# Patient Record
Sex: MALE | Race: WHITE | NOT HISPANIC OR LATINO | Employment: FULL TIME | URBAN - METROPOLITAN AREA
[De-identification: names, ages, dates, MRNs, and addresses within clinical notes are randomized per-mention and may not be internally consistent; named-entity substitution may affect disease eponyms.]

---

## 2018-06-05 ENCOUNTER — CLINICAL SUPPORT (OUTPATIENT)
Dept: URGENT CARE | Facility: CLINIC | Age: 48
End: 2018-06-05

## 2018-06-05 DIAGNOSIS — Z00.00 PHYSICAL EXAM: Primary | ICD-10-CM

## 2018-06-05 PROCEDURE — 80305 DRUG TEST PRSMV DIR OPT OBS: CPT | Mod: S$GLB,,, | Performed by: PREVENTIVE MEDICINE

## 2018-06-05 PROCEDURE — 99499 UNLISTED E&M SERVICE: CPT | Mod: S$GLB,,, | Performed by: PREVENTIVE MEDICINE

## 2020-03-24 ENCOUNTER — NURSE TRIAGE (OUTPATIENT)
Dept: ADMINISTRATIVE | Facility: CLINIC | Age: 50
End: 2020-03-24

## 2020-03-24 NOTE — TELEPHONE ENCOUNTER
48 yo male calls with intermittent cough and fever. Was treated for influenza in December and has ongoing issues with GERD (appt with gastro on 3/27). Cough has been constant for last few months, productive of yellow sputum. Currently afebrile, but says it has been recurring sporadically since his evaluation in December. Describes intermittent dyspnea, but is alleviated by rest. Describes fatigue. No known COVID exposures except for girlfriend who was in contact with confirmed positive case. No recent travel history. Seen recently by PCP and treated for URI, requesting testing for COVID. Referred to [facility], gave coupon code.     Reason for Disposition   [1] Fever (or feeling feverish) OR cough occurs AND [2] travel from or living in high risk area (identified by CDC) AND [3] within last 14 days    Additional Information   Negative: Severe difficulty breathing (e.g., struggling for each breath, speak in single words, bluish lips)   Negative: Sounds like a life-threatening emergency to the triager   Negative: [1] Difficulty breathing (shortness of breath) occurs AND [2] onset > 14 days after COVID-19 EXPOSURE (Close Contact)   Negative: [1] Dry cough occurs AND [2] onset > 14 days after COVID-19 EXPOSURE   Negative: [1] Wet cough (i.e., white-yellow, yellow, green, or torsten colored sputum) AND [2] onset > 14 days after COVID-19 EXPOSURE   Negative: [1] Common cold symptoms AND [2] onset > 14 days after COVID-19 EXPOSURE   Negative: [1] Difficulty breathing occurs AND [2] within 14 days of COVID-19 EXPOSURE (Close Contact)   Negative: Patient sounds very sick or weak to the triager    Protocols used: CORONAVIRUS (COVID-19) EXPOSURE-A-

## 2024-04-17 ENCOUNTER — OFFICE VISIT (OUTPATIENT)
Dept: URGENT CARE | Facility: CLINIC | Age: 54
End: 2024-04-17
Payer: OTHER MISCELLANEOUS

## 2024-04-17 VITALS
SYSTOLIC BLOOD PRESSURE: 138 MMHG | HEART RATE: 90 BPM | RESPIRATION RATE: 18 BRPM | BODY MASS INDEX: 23.49 KG/M2 | HEIGHT: 68 IN | DIASTOLIC BLOOD PRESSURE: 83 MMHG | OXYGEN SATURATION: 97 % | WEIGHT: 155 LBS | TEMPERATURE: 99 F

## 2024-04-17 DIAGNOSIS — F43.0 STRESS REACTION: ICD-10-CM

## 2024-04-17 DIAGNOSIS — F17.200 NEEDS SMOKING CESSATION EDUCATION: ICD-10-CM

## 2024-04-17 DIAGNOSIS — Z02.6 ENCOUNTER RELATED TO WORKER'S COMPENSATION CLAIM: Primary | ICD-10-CM

## 2024-04-17 LAB
CTP QC/QA: YES
POC 10 PANEL DRUG SCREEN: ABNORMAL

## 2024-04-17 PROCEDURE — 80305 DRUG TEST PRSMV DIR OPT OBS: CPT | Mod: S$GLB,,,

## 2024-04-17 PROCEDURE — 99203 OFFICE O/P NEW LOW 30 MIN: CPT | Mod: S$GLB,,,
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
## 2024-05-01 ENCOUNTER — OFFICE VISIT (OUTPATIENT)
Dept: URGENT CARE | Facility: CLINIC | Age: 54
End: 2024-05-01
Payer: OTHER MISCELLANEOUS

## 2024-05-01 VITALS
HEIGHT: 68 IN | HEART RATE: 88 BPM | OXYGEN SATURATION: 97 % | DIASTOLIC BLOOD PRESSURE: 77 MMHG | BODY MASS INDEX: 23.49 KG/M2 | WEIGHT: 155 LBS | SYSTOLIC BLOOD PRESSURE: 121 MMHG | RESPIRATION RATE: 16 BRPM

## 2024-05-01 DIAGNOSIS — Z02.6 ENCOUNTER RELATED TO WORKER'S COMPENSATION CLAIM: ICD-10-CM

## 2024-05-01 DIAGNOSIS — F43.0 STRESS REACTION: Primary | ICD-10-CM

## 2024-05-01 PROCEDURE — 99213 OFFICE O/P EST LOW 20 MIN: CPT | Mod: S$GLB,,,

## 2024-05-01 NOTE — LETTER
[facility]  [address] [address]  Phone: [phone]  Fax: [phone]  [facility]: [id number]    Pt Name: Ebenezer Galicia  Injury Date: 03/04/2024   Employee ID: [id number] Date of First Treatment: 05/01/2024   Company: [employer]      Appointment Time: 03:00 PM Arrived: 2:49 PM    Provider: Anthony Jefferson,  Time Out:3:46 PM      Office Treatment:   1. Stress reaction    2. Encounter related to worker's compensation claim               Restrictions: Regular Duty     Return Appointment: 5/14/2024 at 3:00 PM MAY

## 2024-05-01 NOTE — PROGRESS NOTES
Subjective:      Patient ID: Ebenezer Galicia is a 53 y.o. male.    Chief Complaint: Stress    See MA note.  He continues to experience intermittent symptoms that are unchanged since his last evaluation.  He has been able to perform his job duties without any difficulty but occasionally loses focus due to anxiousness from the event that occurred early in March.  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  More recently he noticed difficulty engaging with some of his own friends during a lunch get together.  He only stayed for lunch for short period time before returning home.  States he felt the knee to avoid social engagement.  Denies any thoughts of hurting himself or others.  During last visit I placed an referral for counselor engagement which is pending approval at this time.    Patient's place of employment - [employer]  Patient's job title - maintenance tech  Date of Injury - 03/04/2024  Body part injured - mental stress  Current work status per last visit - Regular Duty  Improved, same, or worse - same  Pain Scale right now (1-10) -  0/10      Constitution: Negative.   HENT: Negative.     Neck: neck negative.   Cardiovascular: Negative.    Eyes: Negative.    Respiratory: Negative.     Gastrointestinal: Negative.    Musculoskeletal:  Positive for arthritis.   Skin: Negative.    Neurological: Negative.    Psychiatric/Behavioral:  Positive for agitation, nervous/anxious and sleep disturbance. Negative for confusion, hallucinations, homicidal ideas, suicidal ideas, self-injury and substance abuse. The patient is nervous/anxious.      Objective:     Physical Exam  Vitals and nursing note reviewed.   Constitutional:       General: He is not in acute distress.     Appearance: Normal appearance. He is normal weight. He is not ill-appearing or toxic-appearing.   HENT:      Head: Normocephalic.      Right  Ear: External ear normal.      Left Ear: External ear normal.      Nose: Nose normal.   Eyes:      Conjunctiva/sclera: Conjunctivae normal.   Abdominal:      General: Abdomen is flat.   Musculoskeletal:         General: Normal range of motion.      Cervical back: Normal range of motion.   Skin:     General: Skin is warm.      Capillary Refill: Capillary refill takes less than 2 seconds.   Neurological:      General: No focal deficit present.      Mental Status: He is alert.   Psychiatric:         Mood and Affect: Mood normal.         Behavior: Behavior normal.         Thought Content: Thought content normal.         Judgment: Judgment normal.        Assessment:      1. Stress reaction    2. Encounter related to worker's compensation claim      Plan:   I recommended that Jose obtain a journal to document thoughts and feelings throughout his day as a means of helping him better understand some triggers.  This information can also be used by the counselor to help with any sessions they may have.  He did inquire whether medications might be a possibility given his recent episode of difficulty with social engagement.  We did discuss at length any prefers to continue with the possibility counseling before taking any medication.  Should his symptoms worsen then we may consider medication intervention.  He did have a copy of the worker's comp insurance claim number which should help expedite the approval of the order to the counselor.  Maintain regular work duty status and we will reassess in approximately 2 weeks    I spent a total of 25 minutes on the day of the visit.This includes face to face time and non-face to face time preparing to see the patient (eg, review of tests), obtaining and/or reviewing separately obtained history, documenting clinical information in the electronic or other health record, independently interpreting results and communicating results to the patient/family/caregiver, or care  coordinator.             Restrictions: Regular Duty  Follow up in about 13 days (around 5/14/2024).

## 2024-05-14 ENCOUNTER — OFFICE VISIT (OUTPATIENT)
Dept: URGENT CARE | Facility: CLINIC | Age: 54
End: 2024-05-14
Payer: OTHER MISCELLANEOUS

## 2024-05-14 VITALS
BODY MASS INDEX: 23.49 KG/M2 | WEIGHT: 155 LBS | DIASTOLIC BLOOD PRESSURE: 84 MMHG | OXYGEN SATURATION: 99 % | SYSTOLIC BLOOD PRESSURE: 125 MMHG | RESPIRATION RATE: 14 BRPM | HEART RATE: 77 BPM | HEIGHT: 68 IN

## 2024-05-14 DIAGNOSIS — F43.0 STRESS REACTION: Primary | ICD-10-CM

## 2024-05-14 DIAGNOSIS — Z02.6 ENCOUNTER RELATED TO WORKER'S COMPENSATION CLAIM: ICD-10-CM

## 2024-05-14 PROCEDURE — 99213 OFFICE O/P EST LOW 20 MIN: CPT | Mod: S$GLB,,,

## 2024-05-14 NOTE — PROGRESS NOTES
Subjective:      Patient ID: Ebenezer Galicia is a 53 y.o. male.    Chief Complaint: Stress    See MA note.  He continues to experience intermittent symptoms that are unchanged since his last evaluation.  He has been able to perform his job duties without any difficulty but occasionally loses focus due to anxiousness from the event that occurred early in March. Symptoms and acute episodes are unchanged since last evaluation. Denies any thoughts of hurting himself or others.  During last visit I placed an referral for counselor engagement which is pending approval at this time     Patient's place of employment - [employer]  Patient's job title - maintenance tech  Date of Injury - 03/04/2024  Body part injured - mental stress  Current work status per last visit - Regular Duty  Improved, same, or worse - same  Pain Scale right now (1-10) -  0/10      Musculoskeletal:  Negative for pain.   Neurological:  Negative for disorientation.   Psychiatric/Behavioral:  Positive for nervous/anxious and depression. Negative for disorientation, confusion, agitation, sleep disturbance, hallucinations, homicidal ideas, suicidal ideas and self-injury. The patient is nervous/anxious.      Objective:     Physical Exam  Vitals and nursing note reviewed.   Constitutional:       General: He is not in acute distress.     Appearance: Normal appearance. He is normal weight. He is not ill-appearing or toxic-appearing.   HENT:      Head: Normocephalic.      Right Ear: External ear normal.      Left Ear: External ear normal.      Nose: Nose normal.   Eyes:      Conjunctiva/sclera: Conjunctivae normal.   Abdominal:      General: Abdomen is flat.   Musculoskeletal:         General: Normal range of motion.      Cervical back: Normal range of motion.   Skin:     General: Skin is warm.      Capillary Refill: Capillary refill takes less than 2 seconds.   Neurological:      General: No focal deficit present.      Mental Status: He is alert.    Psychiatric:         Mood and Affect: Mood normal.         Behavior: Behavior normal.         Thought Content: Thought content normal.         Judgment: Judgment normal.     Assessment:      1. Stress reaction    2. Encounter related to worker's compensation claim      Plan:   Per my understanding the claim has been now been filed and the referral to see psychologist/counselor has been approved.  Our coordination team is working on getting the appointment set up.  Hopefully an appointment will be set up in the near future.  He still prefers the route of counseling over use of medication which I agree with at this time.  It is possible in the future we may consider medication should he still have difficulties after counseling.  Follow up on a p.r.n. basis but I will keep in contact with her coordination team to ensure that he gets his appointment set up.    I spent a total of 25 minutes on the day of the visit.This includes face to face time and non-face to face time preparing to see the patient (eg, review of tests), obtaining and/or reviewing separately obtained history, documenting clinical information in the electronic or other health record, independently interpreting results and communicating results to the patient/family/caregiver, or care coordinator.       Patient Instructions: Referral to specialist to be scheduled, once authorized   Restrictions: Regular Duty  Follow up if symptoms worsen or fail to improve.

## 2024-05-14 NOTE — LETTER
[facility]  [address] [address]  Phone: [phone]  Fax: [phone]  [facility]: [id number]    Pt Name: Ebenezer Galicia  Injury Date: 03/04/2024   Employee ID: [id number] Date of Treatment: 05/14/2024   Company: [employer]      Appointment Time: 03:00 PM Arrived: 2:50 PM    Provider: Anthony Jefferson,  Time Out:3:43 PM      Office Treatment:   1. Stress reaction    2. Encounter related to worker's compensation claim          Patient Instructions: Referral to specialist to be scheduled, once authorized      Restrictions: Regular Duty     Return As needed. MAY

## 2024-05-28 ENCOUNTER — TELEPHONE (OUTPATIENT)
Dept: URGENT CARE | Facility: CLINIC | Age: 54
End: 2024-05-28
Payer: OTHER MISCELLANEOUS

## 2024-06-05 ENCOUNTER — TELEPHONE (OUTPATIENT)
Dept: PSYCHIATRY | Facility: CLINIC | Age: 54
End: 2024-06-05
Payer: OTHER MISCELLANEOUS

## 2024-06-05 NOTE — TELEPHONE ENCOUNTER
Called pt to discuss STeP program. Pt expressed interest and stated times and days of the week that work better. Will follow up with department to schedule.

## 2024-06-14 ENCOUNTER — CLINICAL SUPPORT (OUTPATIENT)
Dept: PSYCHIATRY | Facility: CLINIC | Age: 54
End: 2024-06-14
Payer: COMMERCIAL

## 2024-06-14 DIAGNOSIS — F43.0 STRESS REACTION: Primary | ICD-10-CM

## 2024-06-14 PROCEDURE — 90791 PSYCH DIAGNOSTIC EVALUATION: CPT | Mod: S$GLB,,, | Performed by: CASE MANAGER/CARE COORDINATOR

## 2024-06-21 ENCOUNTER — TELEPHONE (OUTPATIENT)
Dept: PSYCHIATRY | Facility: CLINIC | Age: 54
End: 2024-06-21
Payer: COMMERCIAL

## 2024-06-25 ENCOUNTER — TELEPHONE (OUTPATIENT)
Dept: PSYCHIATRY | Facility: CLINIC | Age: 54
End: 2024-06-25
Payer: COMMERCIAL

## 2024-07-25 ENCOUNTER — TELEPHONE (OUTPATIENT)
Dept: PSYCHIATRY | Facility: CLINIC | Age: 54
End: 2024-07-25
Payer: COMMERCIAL

## 2024-08-14 ENCOUNTER — TELEPHONE (OUTPATIENT)
Dept: PSYCHIATRY | Facility: CLINIC | Age: 54
End: 2024-08-14
Payer: COMMERCIAL
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
## 2024-08-28 ENCOUNTER — CLINICAL SUPPORT (OUTPATIENT)
Dept: PSYCHIATRY | Facility: CLINIC | Age: 54
End: 2024-08-28
Payer: COMMERCIAL

## 2024-08-28 DIAGNOSIS — F22 PARANOIA: ICD-10-CM

## 2024-08-28 DIAGNOSIS — F41.1 GAD (GENERALIZED ANXIETY DISORDER): ICD-10-CM

## 2024-08-28 PROCEDURE — 90837 PSYTX W PT 60 MINUTES: CPT | Mod: S$GLB,,, | Performed by: CASE MANAGER/CARE COORDINATOR

## 2024-09-27 ENCOUNTER — CLINICAL SUPPORT (OUTPATIENT)
Dept: PSYCHIATRY | Facility: CLINIC | Age: 54
End: 2024-09-27
Payer: COMMERCIAL

## 2024-09-27 DIAGNOSIS — F41.1 GAD (GENERALIZED ANXIETY DISORDER): Primary | ICD-10-CM

## 2024-09-27 DIAGNOSIS — F22 PARANOIA: ICD-10-CM

## 2024-09-27 PROCEDURE — 90837 PSYTX W PT 60 MINUTES: CPT | Mod: S$GLB,,, | Performed by: CASE MANAGER/CARE COORDINATOR

## 2024-10-10 ENCOUNTER — CLINICAL SUPPORT (OUTPATIENT)
Dept: PSYCHIATRY | Facility: CLINIC | Age: 54
End: 2024-10-10
Payer: COMMERCIAL

## 2024-10-10 DIAGNOSIS — F22 PARANOIA: ICD-10-CM

## 2024-10-10 DIAGNOSIS — F41.1 GAD (GENERALIZED ANXIETY DISORDER): Primary | ICD-10-CM

## 2024-10-10 PROCEDURE — 90837 PSYTX W PT 60 MINUTES: CPT | Mod: S$GLB,,, | Performed by: CASE MANAGER/CARE COORDINATOR
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
## 2024-10-24 ENCOUNTER — CLINICAL SUPPORT (OUTPATIENT)
Dept: PSYCHIATRY | Facility: CLINIC | Age: 54
End: 2024-10-24
Payer: COMMERCIAL

## 2024-10-24 DIAGNOSIS — F22 PARANOIA: ICD-10-CM

## 2024-10-24 DIAGNOSIS — F41.1 GAD (GENERALIZED ANXIETY DISORDER): Primary | ICD-10-CM

## 2024-10-24 PROCEDURE — 90837 PSYTX W PT 60 MINUTES: CPT | Mod: S$GLB,,, | Performed by: CASE MANAGER/CARE COORDINATOR

## 2024-11-07 ENCOUNTER — CLINICAL SUPPORT (OUTPATIENT)
Dept: PSYCHIATRY | Facility: CLINIC | Age: 54
End: 2024-11-07
Payer: COMMERCIAL

## 2024-11-07 DIAGNOSIS — F43.10 PTSD (POST-TRAUMATIC STRESS DISORDER): Primary | ICD-10-CM

## 2024-11-07 DIAGNOSIS — F22 PARANOIA: ICD-10-CM

## 2024-11-07 PROCEDURE — 90837 PSYTX W PT 60 MINUTES: CPT | Mod: S$GLB,,, | Performed by: CASE MANAGER/CARE COORDINATOR
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
## 2024-11-21 ENCOUNTER — CLINICAL SUPPORT (OUTPATIENT)
Dept: PSYCHIATRY | Facility: CLINIC | Age: 54
End: 2024-11-21
Payer: COMMERCIAL

## 2024-11-21 DIAGNOSIS — F22 PARANOIA: ICD-10-CM

## 2024-11-21 DIAGNOSIS — F43.10 PTSD (POST-TRAUMATIC STRESS DISORDER): Primary | ICD-10-CM

## 2024-11-21 PROCEDURE — 90837 PSYTX W PT 60 MINUTES: CPT | Mod: 95,,, | Performed by: CASE MANAGER/CARE COORDINATOR
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
## 2025-03-10 ENCOUNTER — OFFICE VISIT (OUTPATIENT)
Dept: URGENT CARE | Facility: CLINIC | Age: 55
End: 2025-03-10
Payer: OTHER MISCELLANEOUS

## 2025-03-10 VITALS
HEART RATE: 70 BPM | TEMPERATURE: 99 F | WEIGHT: 158 LBS | OXYGEN SATURATION: 97 % | BODY MASS INDEX: 23.4 KG/M2 | DIASTOLIC BLOOD PRESSURE: 75 MMHG | RESPIRATION RATE: 18 BRPM | SYSTOLIC BLOOD PRESSURE: 119 MMHG | HEIGHT: 69 IN

## 2025-03-10 DIAGNOSIS — S39.012A ACUTE MYOFASCIAL STRAIN OF LUMBAR REGION, INITIAL ENCOUNTER: Primary | ICD-10-CM

## 2025-03-10 DIAGNOSIS — Z02.6 ENCOUNTER RELATED TO WORKER'S COMPENSATION CLAIM: ICD-10-CM

## 2025-03-10 LAB
BREATH ALCOHOL: 0
CTP QC/QA: YES
POC 10 PANEL DRUG SCREEN: ABNORMAL

## 2025-03-10 PROCEDURE — 72100 X-RAY EXAM L-S SPINE 2/3 VWS: CPT | Mod: S$GLB,,, | Performed by: RADIOLOGY

## 2025-03-10 RX ORDER — BUPROPION HYDROCHLORIDE 150 MG/1
150 TABLET ORAL
COMMUNITY
Start: 2025-02-07

## 2025-03-10 RX ORDER — CYCLOBENZAPRINE HCL 10 MG
5 TABLET ORAL 3 TIMES DAILY PRN
COMMUNITY
Start: 2025-03-08 | End: 2025-03-14 | Stop reason: ALTCHOICE

## 2025-03-10 NOTE — LETTER
[facility]  [address] [address]  Phone: [phone]  Fax: [phone]  [facility]: [id number]    Pt Name: Ebenezer Galicia  Injury Date: 03/07/2025   Employee ID: [id number] Date of First Treatment: 03/10/2025   Company: [employer]      Appointment Time:  Arrived: 10:25 AM    Provider: Masha Dominguez MD Time Out:12:56 PM      Office Treatment:   1. Acute myofascial strain of lumbar region, initial encounter    2. Encounter related to worker's compensation claim          Patient Instructions: Daily home exercises/warm soaks, Apply ice 24-48 hours then apply heat/warm soaks (Use NSAID consistently for 3-4 days then as needed for pain, supplement with regular strength tylenol at the same time. Heating pads okay.)      Restrictions: Avoid frequent bending/lifting/twisting, Avoid climbing/kneeling/squatting, No lifting/pushing/pulling more than 25 lbs     Return Appointment: 3/14/2025 at 3:15 PM MAY

## 2025-03-10 NOTE — PROGRESS NOTES
Subjective:      Patient ID: Ebenezer Galicia is a 54 y.o. male.    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 Negative for trauma and joint swelling.   Skin:  Negative for erythema and bruising.       See MA note above. Begin MD note:    Ebenezer Galicia is a 54 y.o.  presenting for evaluation of low back pain.   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 He had to run a  machine in the wall 3-4 times. He was sitting in front of the sink on the floor using his left hand to pull and push the cable into the drains. He has done this work before, depending on the sink you have to get on knees or sit in an awkward position to get the equipment into place to clear the clog. He felt a little stiff when he was leaving work that day, by the time he finished his 10-15 minute commute home he could barely walk. He did Epsom salt bath on Friday, but was unable to sleep Friday night on his firm mattress. Saturday morning he went to the ED. The doctor diagnosed him with a lumbar strain. He was discharged with Lodine and flexeril. They also gave him toradol injection. He was able to sleep for a few hours after.   Pain does not radiate. No radicular symptoms. No issues with walking.   He denies any prior hip or back issues aside from normal aches and pains. He is working  on quitting smoking.     Objective:     Physical Exam  Vitals and nursing note reviewed.   Constitutional:       General: He is not in acute distress.     Appearance: He is not ill-appearing.   HENT:      Head: Normocephalic.   Eyes:      Conjunctiva/sclera: Conjunctivae normal.   Pulmonary:      Effort: Pulmonary effort is normal. No respiratory distress.   Musculoskeletal:         General: Normal range of motion.      Cervical back: No tenderness.      Thoracic back: No tenderness.      Lumbar back: Tenderness present. No bony tenderness. Normal range of motion. Negative right straight leg raise test and negative left straight leg raise test.        Back:       Comments: FAROM of the lumbar spine with pain noted with lateral bending. Negative SLR bilaterally, reports pain to the left low back when returning leg back to neutral position. Groin and lumbar pain with left JOANN. Left-side resisted hip abduction slightly weaker than right otherwise 5/5 strength. No midline tenderness. TTP of the left lumbar region. Normal gait.    Skin:     General: Skin is warm.      Coloration: Skin is not pale.      Findings: No erythema.   Neurological:      General: No focal deficit present.      Mental Status: He is alert and oriented to person, place, and time.      GCS: GCS eye subscore is 4. GCS verbal subscore is 5. GCS motor subscore is 6.      Motor: Motor function is intact.      Coordination: Coordination is intact.   Psychiatric:         Attention and Perception: Attention normal.         Mood and Affect: Mood normal.         Speech: Speech normal.         Behavior: Behavior normal. Behavior is cooperative.         Thought Content: Thought content normal.        Imaging  XR LUMBAR SPINE 2 OR 3 VIEWS  Result Date: 3/10/2025  EXAMINATION: XR LUMBAR SPINE 2 OR 3 VIEWS CLINICAL HISTORY: Encounter for examination for insurance purposes TECHNIQUE: Lumbar spine 2 or three views COMPARISON: None FINDINGS: Vertebral bodies are  intact. Disc spaces are maintained no collapse or bony destruction is noted.     See above Electronically signed by: Gordon Rm MD Date:    03/10/2025 Time:    12:12      Assessment:      1. Acute myofascial strain of lumbar region, initial encounter    2. Encounter related to worker's compensation claim      Plan:     Radiograph performed to evaluate bony structures, no acute injury noted on independent review with patient. History and exam is consistent with lumbar strain, given the accompanying hip pain I am concerned for deep muscular etiology, psoas versus quadratus lumborum. Advised consistent use of NSAID and muscle relaxer, activity modification, and exercise. Work restrictions were provided to enable productive activity modification and prevent aggravation of injury. Therapy bridge program exercises provided to encourage stretching and mobility. Follow-up at end of week to reassess.        Diagnoses and plan discussed with the patient, as well as the expected course and duration of symptoms. Risks and benefits of any medication prescribed during this visit was explained, verbal instructions on use given. Clinic/Emergency department return precautions were given, can return to [facility] before scheduled follow-up appointment if notes worsening/aggravation of symptoms. All questions and concerns were addressed prior to discharge. Plan was developed with active input from the patient and they verbalized understanding of and agreement with the POC.  [facility] was informed of any referrals and relevant orders.  Note was dictated with voice recognition software, please excuse any grammatical errors.    I spent a total of 30 minutes on the day of the visit.  This includes face to face time and non-face to face time preparing to see the patient (e.g. review of medical record), obtaining and/or reviewing separately obtained history, documenting clinical information in the electronic or other health record,  independently interpreting results and communicating results to the patient/family/caregiver, or care coordinator.    Patient Instructions: Daily home exercises/warm soaks, Apply ice 24-48 hours then apply heat/warm soaks (Use NSAID consistently for 3-4 days then as needed for pain, supplement with regular strength tylenol at the same time. Heating pads okay.)   Restrictions: Avoid frequent bending/lifting/twisting, Avoid climbing/kneeling/squatting, No lifting/pushing/pulling more than 25 lbs  Follow up in about 4 days (around 3/14/2025).

## 2025-03-14 ENCOUNTER — OFFICE VISIT (OUTPATIENT)
Dept: URGENT CARE | Facility: CLINIC | Age: 55
End: 2025-03-14
Payer: OTHER MISCELLANEOUS

## 2025-03-14 VITALS
OXYGEN SATURATION: 97 % | HEIGHT: 69 IN | TEMPERATURE: 98 F | WEIGHT: 158 LBS | DIASTOLIC BLOOD PRESSURE: 77 MMHG | RESPIRATION RATE: 16 BRPM | BODY MASS INDEX: 23.4 KG/M2 | HEART RATE: 86 BPM | SYSTOLIC BLOOD PRESSURE: 123 MMHG

## 2025-03-14 DIAGNOSIS — S39.012A ACUTE MYOFASCIAL STRAIN OF LUMBAR REGION, INITIAL ENCOUNTER: ICD-10-CM

## 2025-03-14 DIAGNOSIS — Z02.6 ENCOUNTER RELATED TO WORKER'S COMPENSATION CLAIM: ICD-10-CM

## 2025-03-14 DIAGNOSIS — S76.012A HIP STRAIN, LEFT, INITIAL ENCOUNTER: Primary | ICD-10-CM

## 2025-03-14 RX ORDER — ETODOLAC 500 MG/1
1 TABLET, FILM COATED ORAL 2 TIMES DAILY
COMMUNITY
Start: 2025-03-08 | End: 2025-03-14 | Stop reason: ALTCHOICE

## 2025-03-14 RX ORDER — METHYLPREDNISOLONE 4 MG/1
TABLET ORAL
Qty: 21 EACH | Refills: 0 | Status: SHIPPED | OUTPATIENT
Start: 2025-03-14 | End: 2025-04-04

## 2025-03-14 RX ORDER — CYCLOBENZAPRINE HCL 10 MG
10 TABLET ORAL 3 TIMES DAILY PRN
Qty: 90 TABLET | Refills: 0 | Status: SHIPPED | OUTPATIENT
Start: 2025-03-14 | End: 2025-04-13

## 2025-03-14 RX ORDER — PROPRANOLOL HYDROCHLORIDE 10 MG/1
TABLET ORAL
COMMUNITY
Start: 2025-02-07

## 2025-03-14 RX ORDER — FLUTICASONE PROPIONATE 50 MCG
2 SPRAY, SUSPENSION (ML) NASAL
COMMUNITY
Start: 2025-02-26

## 2025-03-14 RX ORDER — MELOXICAM 15 MG/1
15 TABLET ORAL DAILY
Qty: 30 TABLET | Refills: 0 | Status: SHIPPED | OUTPATIENT
Start: 2025-03-14 | End: 2025-04-13

## 2025-03-14 NOTE — PROGRESS NOTES
Subjective:      Patient ID: Ebenezer Galicia is a 54 y.o. male.    Chief Complaint: Back Injury    Patient's place of employment - [employer]  Patient's job title - tech  Date of Injury - 03/07/25  Body part injured - Lower back  Current work status per last visit - Light Duty  Improved, same, or worse - Same  Pain Scale right now (1-10) -  5/10, 9/10 when bumping the hip area    Back Pain  This is a new problem. The current episode started in the past 7 days. The problem occurs intermittently. The problem is unchanged. The quality of the pain is described as stabbing and burning (sharp). Radiates to: left groin area. The pain is at a severity of 5/10. The pain is moderate. The pain is The same all the time. The symptoms are aggravated by bending, lying down, sitting and twisting. Stiffness is present All day. Associated symptoms include leg pain. Pertinent negatives include no numbness or tingling.       Musculoskeletal:  Positive for back pain.   Skin:  Negative for erythema.   Neurological:  Negative for numbness.       See MA note above. Begin MD note:    Ebenezer Galicia is a 54 y.o. presenting for follow-up of back injury.  Recommendations from last visit: continue medications, home exercises, activity modification  Today, he reports continued pain to the low back that feels to be between his back and left hip. He has pain with certain movements, he is unable to cross his left leg over the right. The pain feels deep in the hip and sometimes in the front of the groin.   He took his last flexeril today. He was using the Lodine twice daily and noticed that coupled with tylenol and flexeril it numbed the pain. He continues to sleep on the couch as he is able to use pillows for lumbar support and get into a comfortable enough position.     Objective:     Physical Exam  Vitals and nursing note reviewed.   Constitutional:       General: He is not in acute distress.     Appearance:  He is not ill-appearing.   HENT:      Head: Normocephalic.   Eyes:      Conjunctiva/sclera: Conjunctivae normal.   Pulmonary:      Effort: Pulmonary effort is normal. No respiratory distress.   Musculoskeletal:         General: Normal range of motion.      Cervical back: No tenderness.      Thoracic back: No tenderness.      Lumbar back: Tenderness present. No bony tenderness. Normal range of motion. Negative right straight leg raise test and negative left straight leg raise test.        Back:       Comments: Groin and lumbar pain with left JOANN. Slightly antalgic gait.   Skin:     General: Skin is warm.      Coloration: Skin is not pale.      Findings: No erythema.   Neurological:      General: No focal deficit present.      Mental Status: He is alert and oriented to person, place, and time.      GCS: GCS eye subscore is 4. GCS verbal subscore is 5. GCS motor subscore is 6.      Motor: Motor function is intact.      Coordination: Coordination is intact.   Psychiatric:         Attention and Perception: Attention normal.         Mood and Affect: Mood normal.         Speech: Speech normal.         Behavior: Behavior normal. Behavior is cooperative.         Thought Content: Thought content normal.        Assessment:      1. Hip strain, left, initial encounter    2. Encounter related to worker's compensation claim    3. Acute myofascial strain of lumbar region, initial encounter      Plan:     Pain is localized to the left hip. We will trial a short course of steroids to address inflammation and then a longer acting NSAID. If no notable improvements upon follow-up will refer for PT. He can return to clinic sooner if he notes improvements and can tolerate return to regular duty.     Medications Ordered This Encounter   Medications    cyclobenzaprine (FLEXERIL) 10 MG tablet     Sig: Take 1 tablet (10 mg total) by mouth 3 (three) times daily as needed for Muscle spasms.     Dispense:  90 tablet     Refill:  0    meloxicam  (MOBIC) 15 MG tablet     Sig: Take 1 tablet (15 mg total) by mouth once daily.     Dispense:  30 tablet     Refill:  0    methylPREDNISolone (MEDROL DOSEPACK) 4 mg tablet     Sig: use as directed     Dispense:  21 each     Refill:  0     Diagnoses and plan discussed with the patient, as well as the expected course and duration of symptoms. Risks and benefits of any medication prescribed during this visit was explained, verbal instructions on use given. Clinic/Emergency department return precautions were given, can return to [facility] before scheduled follow-up appointment if notes worsening/aggravation of symptoms. All questions and concerns were addressed prior to discharge. Plan was developed with active input from the patient and they verbalized understanding of and agreement with the POC.  [facility] was informed of any referrals and relevant orders.  Note was dictated with voice recognition software, please excuse any grammatical errors.    I spent a total of 25 minutes on the day of the visit.  This includes face to face time and non-face to face time preparing to see the patient (e.g. review of medical record), obtaining and/or reviewing separately obtained history, documenting clinical information in the electronic or other health record, independently interpreting results and communicating results to the patient/family/caregiver, or care coordinator.    Patient Instructions: Daily home exercises/warm soaks (No NSAID use while taking the steroid, okay to use tylenol and muscle relaxer. Can restart use of NSAID after you complete the steroid.)   Restrictions: Avoid frequent bending/lifting/twisting, Avoid climbing/kneeling/squatting, No lifting/pushing/pulling more than 25 lbs  Follow up in about 1 week (around 3/21/2025).

## 2025-03-14 NOTE — LETTER
[facility]  [address] [address]  Phone: [phone]  Fax: [phone]  [facility]: [id number]    Pt Name: Ebenezer Galicia  Injury Date: 03/07/2025   Employee ID: [id number] Date of Treatment: 03/14/2025   Company: [employer]      Appointment Time: 03:00 PM Arrived: 3:15 pm   Provider: Masha Dominguez MD Time Out:4:00 pm     Office Treatment:   1. Hip strain, left, initial encounter    2. Encounter related to worker's compensation claim    3. Acute myofascial strain of lumbar region, initial encounter      Medications Ordered This Encounter   Medications    cyclobenzaprine (FLEXERIL) 10 MG tablet    meloxicam (MOBIC) 15 MG tablet    methylPREDNISolone (MEDROL DOSEPACK) 4 mg tablet      Patient Instructions: Daily home exercises/warm soaks (No NSAID use while taking the steroid, okay to use tylenol and muscle relaxer. Can restart use of NSAID after you complete the steroid.)      Restrictions: Avoid frequent bending/lifting/twisting, Avoid climbing/kneeling/squatting, No lifting/pushing/pulling more than 25 lbs     Return Appointment: 3/21/2025 at 3:00 pm

## 2025-03-21 ENCOUNTER — OFFICE VISIT (OUTPATIENT)
Dept: URGENT CARE | Facility: CLINIC | Age: 55
End: 2025-03-21
Payer: OTHER MISCELLANEOUS

## 2025-03-21 VITALS
WEIGHT: 158 LBS | HEIGHT: 68 IN | RESPIRATION RATE: 18 BRPM | BODY MASS INDEX: 23.95 KG/M2 | SYSTOLIC BLOOD PRESSURE: 117 MMHG | TEMPERATURE: 98 F | DIASTOLIC BLOOD PRESSURE: 74 MMHG | HEART RATE: 97 BPM | OXYGEN SATURATION: 98 %

## 2025-03-21 DIAGNOSIS — Z02.6 ENCOUNTER RELATED TO WORKER'S COMPENSATION CLAIM: ICD-10-CM

## 2025-03-21 DIAGNOSIS — S39.012A ACUTE MYOFASCIAL STRAIN OF LUMBAR REGION, INITIAL ENCOUNTER: ICD-10-CM

## 2025-03-21 DIAGNOSIS — S76.012A HIP STRAIN, LEFT, INITIAL ENCOUNTER: Primary | ICD-10-CM

## 2025-03-21 NOTE — PROGRESS NOTES
Subjective:      Patient ID: Ebenezer Galicia is a 54 y.o. male.    Chief Complaint: Back Pain (low)    Patient's place of employment - [employer]  Patient's job title - Maint tech  Date of Injury - 3/7/25  Body part injured - low back  Current work status per last visit - light  Improved, same, or worse - improved  Pain Scale right now (1-10) -  4/10    Back Pain  Pertinent negatives include no numbness.       Musculoskeletal:  Positive for pain, abnormal ROM of joint, back pain, pain with walking, muscle cramps and muscle ache.   Skin:  Negative for erythema and bruising.   Neurological:  Negative for numbness and tingling.     See MA note above. Begin MD note:    Ebenezer Galicia is a 54 y.o. presenting for follow-up of back/hip injury.  Recommendations from last visit: home exercises, medrol, flexeril, mobic  Today, he reports that he began moving better Wednesday night. He finished the steroid yesterday. He continues to use the flexeril and mobic. He hasn't had to supplement the mobic with tylenol. He has been doing the stretching exercises.   He feels he can return to regular duty. He has worked in some sort of pain or another for a long time. He thinks he just needs to get back to moving in the way he is used to.     Objective:     Physical Exam  Vitals and nursing note reviewed.   Constitutional:       General: He is not in acute distress.     Appearance: He is not ill-appearing.   HENT:      Head: Normocephalic.   Eyes:      Conjunctiva/sclera: Conjunctivae normal.   Pulmonary:      Effort: Pulmonary effort is normal. No respiratory distress.   Musculoskeletal:      Cervical back: No tenderness.      Thoracic back: No tenderness.      Lumbar back: Tenderness present. No bony tenderness. Normal range of motion. Negative right straight leg raise test and negative left straight leg raise test.        Back:       Comments: Able to independently perform JOANN without pain. Resisted HF  5/5 on right, 4+/5 on left. FAROM of the lumbar spine without pain. TTP of the left lateral lumbar region Normal gait.    Skin:     General: Skin is warm.      Coloration: Skin is not pale.      Findings: No erythema.   Neurological:      General: No focal deficit present.      Mental Status: He is alert and oriented to person, place, and time.      GCS: GCS eye subscore is 4. GCS verbal subscore is 5. GCS motor subscore is 6.      Motor: Motor function is intact.      Coordination: Coordination is intact.   Psychiatric:         Attention and Perception: Attention normal.         Mood and Affect: Mood normal.         Speech: Speech normal.         Behavior: Behavior normal. Behavior is cooperative.         Thought Content: Thought content normal.        Assessment:      1. Hip strain, left, initial encounter    2. Encounter related to worker's compensation claim    3. Acute myofascial strain of lumbar region, initial encounter      Plan:     Return to regular duty. Hip focused exercises were provided to encourage mobility and improve hip flexibility. He will return to clinic if he experiences any worsening of pain symptoms with return to work.        Diagnoses and plan discussed with the patient, as well as the expected course and duration of symptoms. Risks and benefits of any medication prescribed during this visit was explained, verbal instructions on use given. Clinic/Emergency department return precautions were given, can return to [facility] before scheduled follow-up appointment if notes worsening/aggravation of symptoms. All questions and concerns were addressed prior to discharge. Plan was developed with active input from the patient and they verbalized understanding of and agreement with the POC.  [facility] was informed of any referrals and relevant orders.  Note was dictated with voice recognition software, please excuse any grammatical errors.    I spent a total of 25 minutes on the day of the visit.  This  includes face to face time and non-face to face time preparing to see the patient (e.g. review of medical record), obtaining and/or reviewing separately obtained history, documenting clinical information in the electronic or other health record, independently interpreting results and communicating results to the patient/family/caregiver, or care coordinator.    Patient Instructions: Daily home exercises/warm soaks   Restrictions: Regular Duty (RTW 3/24/25)  Follow up if symptoms worsen or fail to improve.

## 2025-03-21 NOTE — LETTER
[facility]  [address] [address]  Phone: [phone]  Fax: [phone]  [facility] [facility]: [email or web address]    Pt Name: Ebenezer Galicia  Injury Date: 03/07/2025   Employee ID: [id number] Date of Treatment: 03/21/2025   Company: [employer]      Appointment Time: 03:00 PM Arrived: 2:55 PM    Provider: Masha Dominguez MD Time Out:3:29 PM      Office Treatment:   1. Hip strain, left, initial encounter    2. Encounter related to worker's compensation claim    3. Acute myofascial strain of lumbar region, initial encounter          Patient Instructions: Daily home exercises/warm soaks      Restrictions: Regular Duty (RTW 3/24/25)     Return As needed. MAY